# Patient Record
Sex: MALE | URBAN - METROPOLITAN AREA
[De-identification: names, ages, dates, MRNs, and addresses within clinical notes are randomized per-mention and may not be internally consistent; named-entity substitution may affect disease eponyms.]

---

## 2018-01-22 ENCOUNTER — IMPORTED ENCOUNTER (OUTPATIENT)
Dept: URBAN - METROPOLITAN AREA CLINIC 31 | Facility: CLINIC | Age: 78
End: 2018-01-22

## 2018-01-22 PROBLEM — H26.491: Noted: 2018-01-22

## 2018-01-22 PROBLEM — H25.812: Noted: 2018-01-22

## 2018-01-22 PROBLEM — H35.3131: Noted: 2018-01-22

## 2018-01-22 PROBLEM — Z96.1: Noted: 2018-01-22

## 2018-01-22 PROCEDURE — 99204 OFFICE O/P NEW MOD 45 MIN: CPT

## 2018-01-22 NOTE — PATIENT DISCUSSION
1. Combined Types of Cataract OS: Discussed the risks benefits alternatives and limitations of cataract surgery including infection bleeding loss of vision retinal tears detachment. Refractive options were reviewed. The patient stated a full understanding and a desire to proceed with the procedure in the left eye. Patient has elected to be optimized for distance vision in the left eye. The patient will still need glasses for reading and to possibly fine tune distance vision. 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 2. ARMD OU dry - Importance of smoking cessation blood pressure control and healthy diet were emphasized. In accordance with the AREDS study a good multivitamin containing EC and Zinc were recommened to be taken daily. Patient was instructed to self monitor their monocular vision (reading/Amsler Grid) at least weekly. Patient should immediately report any new onset of decreased vision or metamorphopsia. 3. Pseudophakia OD - IOL stable. Monitor. 4. PCO  OD (Posterior Capsule Opacification)  Not visually significant at this time. Monitor for yag capsulotomy necessity. 5. Return for an appointment for [address] [place] [address] with Dr. Flavio Frankel.

## 2018-01-31 ENCOUNTER — IMPORTED ENCOUNTER (OUTPATIENT)
Dept: URBAN - METROPOLITAN AREA CLINIC 31 | Facility: CLINIC | Age: 78
End: 2018-01-31

## 2018-01-31 PROBLEM — H25.812: Noted: 2018-01-31

## 2018-01-31 PROCEDURE — 76519 ECHO EXAM OF EYE: CPT

## 2018-01-31 NOTE — PATIENT DISCUSSION
Combined Types of Cataract OS: Discussed the risks benefits alternatives and limitations of cataract surgery including infection bleeding loss of vision retinal tears detachment. Refractive options were reviewed. The patient stated a full understanding and a desire to proceed with the procedure in the left eye. Patient has elected to be optimized for distance vision in the left eye. The patient will still need glasses for reading and to possibly fine tune distance vision. repeat [facility] in a week Os corneal dryness distortion affecting results repeat next week.

## 2018-02-06 ENCOUNTER — IMPORTED ENCOUNTER (OUTPATIENT)
Dept: URBAN - METROPOLITAN AREA CLINIC 31 | Facility: CLINIC | Age: 78
End: 2018-02-06

## 2018-02-06 PROBLEM — H25.812: Noted: 2018-02-06

## 2018-02-06 PROBLEM — H18.59: Noted: 2018-02-06

## 2018-02-06 PROBLEM — Z96.1: Noted: 2018-02-06

## 2018-02-06 PROCEDURE — 99213 OFFICE O/P EST LOW 20 MIN: CPT

## 2018-02-06 PROCEDURE — 92025 CPTRIZED CORNEAL TOPOGRAPHY: CPT

## 2018-02-06 NOTE — PATIENT DISCUSSION
1.  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 Option of SK vs NaCL iban discussed. Discussed the risks benefits and alternatives to superficial keratectomy including infection success rate and recurrence. Schedule if desired. SK OS hold on cataract surgery until surface is healed. 2. Combined Types of Cataract OS: Discussed the risks benefits alternatives and limitations of cataract surgery including infection bleeding loss of vision retinal tears detachment. Refractive options were reviewed. The patient stated a full understanding and a desire to proceed with the procedure in the left eye. Patient has elected to be optimized for distance vision in the left eye. The patient will still need glasses for reading and to possibly fine tune distance vision. will wait until surface os is stable3. Pseudophakia OD - IOL stable. Monitor.

## 2018-03-19 ENCOUNTER — IMPORTED ENCOUNTER (OUTPATIENT)
Dept: URBAN - METROPOLITAN AREA CLINIC 31 | Facility: CLINIC | Age: 78
End: 2018-03-19

## 2018-03-19 PROBLEM — H18.59: Noted: 2018-03-19

## 2018-03-19 PROCEDURE — 65400 REMOVAL OF EYE LESION: CPT

## 2018-03-19 PROCEDURE — 92071 CONTACT LENS FITTING FOR TX: CPT

## 2018-03-19 NOTE — PATIENT DISCUSSION
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 Discussed the risks benefits and alternatives to superficial keratectomy including infection success rate and recurrence. SK performed in office without difficulty. PO instructions and medications reviewed. Return for an appointment in 4 days for post op exam. with Dr. Mary Lou Bauman.

## 2018-03-23 ENCOUNTER — IMPORTED ENCOUNTER (OUTPATIENT)
Dept: URBAN - METROPOLITAN AREA CLINIC 31 | Facility: CLINIC | Age: 78
End: 2018-03-23

## 2018-03-23 PROBLEM — Z98.89: Noted: 2018-03-23

## 2018-03-23 PROCEDURE — 99024 POSTOP FOLLOW-UP VISIT: CPT

## 2018-03-23 NOTE — PATIENT DISCUSSION
Post Operative: Doing well po drops as instructed tears prn. Call with any problems. DC prolensa Besivance for 3 days Lotemax 3x/d tears prnReturn for an appointment in 2 weeks for post op exam. with Dr. Beatriz Gallo.

## 2018-04-06 ENCOUNTER — IMPORTED ENCOUNTER (OUTPATIENT)
Dept: URBAN - METROPOLITAN AREA CLINIC 31 | Facility: CLINIC | Age: 78
End: 2018-04-06

## 2018-04-06 PROBLEM — Z96.1: Noted: 2018-04-06

## 2018-04-06 PROBLEM — Z98.89: Noted: 2018-04-06

## 2018-04-06 PROBLEM — H35.3132: Noted: 2018-04-06

## 2018-04-06 PROBLEM — H25.812: Noted: 2018-04-06

## 2018-04-06 PROCEDURE — 99024 POSTOP FOLLOW-UP VISIT: CPT

## 2018-04-06 NOTE — PATIENT DISCUSSION
Combined Types of Cataract OS: Discussed the risks benefits alternatives and limitations of cataract surgery including infection bleeding loss of vision retinal tears detachment. Refractive options were reviewed. The patient stated a full understanding and a desire to proceed with the procedure in the left eye. Patient has elected to be optimized for distance vision in the left eye. The patient will still need glasses for reading and to possibly fine tune distance vision.  Schedule KPE/IOL OS

## 2018-04-06 NOTE — PATIENT DISCUSSION
1.  Post Operative: s/p SK for ABMD Doing well  tears prn. Call with any problems. Pt leaving for summer will do cataract surgery when he returns. 2. Combined Types of Cataract OS: Discussed the risks benefits alternatives and limitations of cataract surgery including infection bleeding loss of vision retinal tears detachment. Refractive options were reviewed. The patient stated a full understanding and a desire to proceed with the procedure in the left eye. Patient has elected to be optimized for distance vision in the left eye. The patient will still need glasses for reading and to possibly fine tune distance vision. Schedule KPE/IOL OS3. Pseudophakia OD - IOL stable. Monitor. 4. ARMD OU dry - Importance of smoking cessation blood pressure control and healthy diet were emphasized. In accordance with the AREDS study a good multivitamin containing EC and Zinc were recommened to be taken daily. Patient was instructed to self monitor their monocular vision (reading/Amsler Grid) at least weekly. Patient should immediately report any new onset of decreased vision or metamorphopsia. 5. Return for an appointment in 6 months for Admit and dilated fundus exam. with Dr. Barbar Castleman.

## 2018-10-24 ENCOUNTER — IMPORTED ENCOUNTER (OUTPATIENT)
Dept: URBAN - METROPOLITAN AREA CLINIC 31 | Facility: CLINIC | Age: 78
End: 2018-10-24

## 2018-10-24 PROBLEM — H18.59: Noted: 2018-10-24

## 2018-10-24 PROBLEM — H25.812: Noted: 2018-10-24

## 2018-10-24 PROBLEM — Z96.1: Noted: 2018-10-24

## 2018-10-24 PROCEDURE — 76519 ECHO EXAM OF EYE: CPT

## 2018-10-24 PROCEDURE — 92015 DETERMINE REFRACTIVE STATE: CPT

## 2018-10-24 PROCEDURE — 99214 OFFICE O/P EST MOD 30 MIN: CPT

## 2018-10-24 NOTE — PATIENT DISCUSSION
1. Combined Types of Cataract OS: Discussed the risks benefits alternatives and limitations of cataract surgery including infection bleeding loss of vision retinal tears detachment. Refractive options were reviewed. The patient stated a full understanding and a desire to proceed with the procedure in the left eye. Patient has elected to be optimized for distance vision in the left eye. The patient will still need glasses for reading and to possibly fine tune distance vision. Sched KPE/IOL OS PORE [place]2. Pseudophakia OD - IOL stable. Monitor. 3.  s/p SK for ABMD surface improved.

## 2018-11-06 ENCOUNTER — IMPORTED ENCOUNTER (OUTPATIENT)
Dept: URBAN - METROPOLITAN AREA CLINIC 31 | Facility: CLINIC | Age: 78
End: 2018-11-06

## 2018-11-06 PROBLEM — Z96.1: Noted: 2018-11-06

## 2018-11-06 PROCEDURE — 99024 POSTOP FOLLOW-UP VISIT: CPT

## 2018-11-06 NOTE — PATIENT DISCUSSION
Post-Op Day #1 - Cataract Surgery Left Eye (OS) - doing well. Tears prn. Continue postop drops as directed. Call office with symptoms of pain redness or decreased vision in operative eye.  1 drop of zylet instilledReturn for an appointment in 1 week for post op refraction. with Dr. Lexi Michel.

## 2018-11-13 ENCOUNTER — IMPORTED ENCOUNTER (OUTPATIENT)
Dept: URBAN - METROPOLITAN AREA CLINIC 31 | Facility: CLINIC | Age: 78
End: 2018-11-13

## 2018-11-13 PROBLEM — H18.59: Noted: 2018-11-13

## 2018-11-13 PROBLEM — Z96.1: Noted: 2018-11-13

## 2018-11-13 PROCEDURE — 99024 POSTOP FOLLOW-UP VISIT: CPT

## 2018-11-13 NOTE — PATIENT DISCUSSION
s/p SK for ABMD doing wellReturn for an appointment in 4 months for dilated fundus exam. with Dr. Juan Watt

## 2018-11-13 NOTE — PATIENT DISCUSSION
1.  Post-Op Week #2 - Cataract Surgery Left Eye (OS) -  Intraocular lens stable and surgery very well healed. Patient to resume all normal activities. Finish postop drops as directed. Final Refraction given if necessary. 2.  s/p SK for ABMD doing wellReturn for an appointment in 4 months for dilated fundus exam. with Dr. Beatriz Gallo.

## 2019-02-07 ENCOUNTER — IMPORTED ENCOUNTER (OUTPATIENT)
Dept: URBAN - METROPOLITAN AREA CLINIC 31 | Facility: CLINIC | Age: 79
End: 2019-02-07

## 2019-02-07 PROBLEM — H35.3131: Noted: 2019-02-07

## 2019-02-07 PROBLEM — Z96.1: Noted: 2019-02-07

## 2019-02-07 PROBLEM — H26.493: Noted: 2019-02-07

## 2019-02-07 PROBLEM — H59.032: Noted: 2019-02-07

## 2019-02-07 PROCEDURE — 99214 OFFICE O/P EST MOD 30 MIN: CPT

## 2019-02-07 NOTE — PATIENT DISCUSSION
1.  ARMD OU dry - Importance of smoking cessation blood pressure control and healthy diet were emphasized. In accordance with the AREDS study a good multivitamin containing EC and Zinc were recommened to be taken daily. Patient was instructed to self monitor their monocular vision (reading/Amsler Grid) at least weekly. Patient should immediately report any new onset of decreased vision or metamorphopsia. 2. Pseudophakia OU - IOLs stable. Monitor. 3. PCO OU: (Posterior Capsule Opacification)  Not visually significant at this time. Monitor for yag capsulotomy necessity. 4. Cystoid Macular Edema OS - CME VS SRF referred to [facility].

## 2019-04-18 ENCOUNTER — IMPORTED ENCOUNTER (OUTPATIENT)
Dept: URBAN - METROPOLITAN AREA CLINIC 31 | Facility: CLINIC | Age: 79
End: 2019-04-18

## 2019-04-18 PROBLEM — H26.493: Noted: 2019-04-18

## 2019-04-18 PROBLEM — H35.373: Noted: 2019-04-18

## 2019-04-18 PROBLEM — H26.492: Noted: 2019-04-18

## 2019-04-18 PROBLEM — Z96.1: Noted: 2019-04-18

## 2019-04-18 PROCEDURE — 99214 OFFICE O/P EST MOD 30 MIN: CPT

## 2019-04-18 NOTE — PATIENT DISCUSSION
1.  PCO OU: (Posterior Capsule Opacification)  Not visually significant at this time. Monitor for yag capsulotomy necessity. may need yag os in near future pt defers for now. 2. Pseudophakia OU - IOLs stable. Monitor. 3. Epiretinal Membrane OU - resolved CME4. Return for an appointment in 6 months for dilated fundus exam. OCT Nerve. with Dr. Panda Leon.

## 2020-01-21 ENCOUNTER — IMPORTED ENCOUNTER (OUTPATIENT)
Dept: URBAN - METROPOLITAN AREA CLINIC 31 | Facility: CLINIC | Age: 80
End: 2020-01-21

## 2020-01-21 PROBLEM — H35.3132: Noted: 2020-01-21

## 2020-01-21 PROBLEM — H26.493: Noted: 2020-01-21

## 2020-01-21 PROBLEM — Z96.1: Noted: 2020-01-21

## 2020-01-21 PROBLEM — H35.373: Noted: 2020-01-21

## 2020-01-21 PROCEDURE — 92134 CPTRZ OPH DX IMG PST SGM RTA: CPT

## 2020-01-21 PROCEDURE — 92014 COMPRE OPH EXAM EST PT 1/>: CPT

## 2020-01-21 NOTE — PATIENT DISCUSSION
1.  PCO  OU (Posterior Capsule Opacification)   PCO is visually significant and impairment of vision does not meet the patient’s functional needs or interferes with activities of daily living. Risks benefits and alternatives to the Nd:YAG Laser reviewed including elevated IOP immediately postop and retinal tear/detachment. Patient to notify their ophthalmologist promptly if they have a significant change in symptoms such as flashes of light (photopsia) an increase in floaters loss of visual field or decrease in visual acuity after the procedure. Patient will be scheduled in [place] [id number] for Nd:YAG Laser. SChedule OS/OD2. Pseudophakia OU - IOLs stable. Monitorfor changes in vision. 3. ARMD OU dry - Importance of smoking cessation blood pressure control and healthy diet were emphasized. In accordance with the AREDS study a good multivitamin containing EC and Zinc were recommened to be taken daily. Patient was instructed to self monitor their monocular vision (reading/Amsler Grid) at least weekly. Patient should immediately report any new onset of decreased vision or metamorphopsia.

## 2022-04-01 ASSESSMENT — VISUAL ACUITY
OS_CC: 20/40-2
OS_PH: SC 20/60
OD_CC: 20/25+2
OD_CC: 20/25-2
OS_PH: SC 20/25
OS_SC: 20/70
OD_SC: 20/20-2
OS_SC: 20/70-2
OD_CC: 20/25
OD_CC: 20/30
OD_CC: 20/20-3
OD_CC: 20/20-1
OS_PH: SC 20/25
OD_SC: 20/50
OS_GLARE: 20/70MED
OD_CC: 20/25+2
OS_CC: 20/30-2
OD_CC: 20/25
OS_PH: SC 20/40 -1
OD_GLARE: 20/50MED
OS_CC: 20/50
OS_CC: 20/40+2
OS_GLARE: 20/60
OS_CC: 20/50-1
OS_CC: 20/50-2
OS_CC: 20/70+1
OS_CC: 20/70
OD_CC: 20/25
OS_PH: SC 20/60
OS_GLARE: 20/80MED
OS_CC: 20/60-1

## 2022-04-01 ASSESSMENT — TONOMETRY
OS_IOP_MMHG: 17
OS_IOP_MMHG: 19
OD_IOP_MMHG: 14
OD_IOP_MMHG: 17
OS_IOP_MMHG: 13
OD_IOP_MMHG: 16
OD_IOP_MMHG: 12
OS_IOP_MMHG: 20
OS_IOP_MMHG: 13
OD_IOP_MMHG: 15
OS_IOP_MMHG: 14

## 2022-05-13 NOTE — PATIENT DISCUSSION
5 13 22 MILD EDEMA NASAL TO FOVEAL - SLIGHT INCREASE - TO FOLLOW AND REEVAL IN 6 MONTHS AS STILL VERY MILD.

## 2022-07-30 ENCOUNTER — TELEPHONE ENCOUNTER (OUTPATIENT)
Age: 82
End: 2022-07-30

## 2022-07-31 ENCOUNTER — TELEPHONE ENCOUNTER (OUTPATIENT)
Age: 82
End: 2022-07-31